# Patient Record
(demographics unavailable — no encounter records)

---

## 2024-12-20 NOTE — PHYSICAL EXAM
[No Acute Distress] : no acute distress [Normal] : the outer ears and nose were normal in appearance and the oropharynx was normal [No Respiratory Distress] : no respiratory distress  [No Edema] : there was no peripheral edema

## 2024-12-22 NOTE — ASSESSMENT
[FreeTextEntry1] : Tension Headaches:  naproxen 500 mg BID, Cyclobenzaprine  Maintain good posture and take breaks during prolonged sitting or screen use. Practice stress-reducing activities like deep breathing, yoga, or meditation. Use over-the-counter pain relievers (e.g., acetaminophen, ibuprofen) as needed. Apply a cold or warm compress to the forehead, neck, or shoulders. Engage in regular, low-impact exercise and stretch neck and shoulder muscles daily. Monitor headache patterns; seek medical attention if symptoms worsen or change.   Prediabetes low carbohydrate diet discussed with the patient check hemoglobin A1c intervention pending results Hyperlipidemia check fasting lipids intervention pending results Elevated LFTs monitor labs Anemia check labs.  Intervention pending results will obtain GI consult if persists     Discussed the importance and benefit of a healthy lifestyle, including a heart-healthy diet such as the Mediterranean diet, regular exercise, and 7 hours of sleep nightly.  Total time 30 min ( 20 min. FTF and 10 min chart review and documentation.)

## 2024-12-22 NOTE — HISTORY OF PRESENT ILLNESS
[FreeTextEntry1] : Headaches [de-identified] : The patient is a 52-year-old female presenting with a two-week history of dull, aching head pain. She describes the sensation as a tightness or pressure predominantly around the forehead and back of the head and neck. Additionally, she reports tenderness in the scalp, neck, and shoulder muscles. The pain is bilateral and does not radiate. She denies associated symptoms such as nausea, vomiting, photophobia, phonophobia, or visual disturbances. The headaches are not triggered by specific activities and have been consistent in character since onset. There is no history of similar episodes in the past, and she denies recent trauma, fever, or other systemic symptoms.

## 2024-12-22 NOTE — HISTORY OF PRESENT ILLNESS
[FreeTextEntry1] : Headaches [de-identified] : The patient is a 52-year-old female presenting with a two-week history of dull, aching head pain. She describes the sensation as a tightness or pressure predominantly around the forehead and back of the head and neck. Additionally, she reports tenderness in the scalp, neck, and shoulder muscles. The pain is bilateral and does not radiate. She denies associated symptoms such as nausea, vomiting, photophobia, phonophobia, or visual disturbances. The headaches are not triggered by specific activities and have been consistent in character since onset. There is no history of similar episodes in the past, and she denies recent trauma, fever, or other systemic symptoms.